# Patient Record
Sex: MALE | Race: WHITE | NOT HISPANIC OR LATINO | Employment: OTHER | URBAN - METROPOLITAN AREA
[De-identification: names, ages, dates, MRNs, and addresses within clinical notes are randomized per-mention and may not be internally consistent; named-entity substitution may affect disease eponyms.]

---

## 2018-03-26 ENCOUNTER — HOSPITAL ENCOUNTER (OUTPATIENT)
Dept: RADIOLOGY | Facility: HOSPITAL | Age: 73
Discharge: HOME | End: 2018-03-26
Attending: PHYSICIAN ASSISTANT
Payer: MEDICARE

## 2018-03-26 ENCOUNTER — OFFICE VISIT (OUTPATIENT)
Dept: URGENT CARE | Facility: CLINIC | Age: 73
End: 2018-03-26
Payer: MEDICARE

## 2018-03-26 VITALS
OXYGEN SATURATION: 95 % | SYSTOLIC BLOOD PRESSURE: 122 MMHG | DIASTOLIC BLOOD PRESSURE: 70 MMHG | TEMPERATURE: 100 F | RESPIRATION RATE: 20 BRPM | BODY MASS INDEX: 32.34 KG/M2 | WEIGHT: 252 LBS | HEART RATE: 82 BPM | HEIGHT: 74 IN

## 2018-03-26 DIAGNOSIS — S61.215A LACERATION OF LEFT RING FINGER WITHOUT DAMAGE TO NAIL, FOREIGN BODY PRESENCE UNSPECIFIED, INITIAL ENCOUNTER: ICD-10-CM

## 2018-03-26 DIAGNOSIS — S61.215A LACERATION OF LEFT RING FINGER WITHOUT DAMAGE TO NAIL, FOREIGN BODY PRESENCE UNSPECIFIED, INITIAL ENCOUNTER: Primary | ICD-10-CM

## 2018-03-26 DIAGNOSIS — S99.912A INJURY OF LEFT ANKLE, INITIAL ENCOUNTER: ICD-10-CM

## 2018-03-26 PROCEDURE — 99999 PR PBB SHADOW E&M-EST. PATIENT-LVL V: CPT | Mod: PBBFAC,,, | Performed by: PHYSICIAN ASSISTANT

## 2018-03-26 PROCEDURE — 73610 X-RAY EXAM OF ANKLE: CPT | Mod: TC,FY,PO,LT

## 2018-03-26 PROCEDURE — 73610 X-RAY EXAM OF ANKLE: CPT | Mod: 26,LT,, | Performed by: RADIOLOGY

## 2018-03-26 PROCEDURE — 99214 OFFICE O/P EST MOD 30 MIN: CPT | Mod: 25,S$GLB,, | Performed by: PHYSICIAN ASSISTANT

## 2018-03-26 PROCEDURE — 73130 X-RAY EXAM OF HAND: CPT | Mod: TC,FY,PO,LT

## 2018-03-26 PROCEDURE — 12002 RPR S/N/AX/GEN/TRNK2.6-7.5CM: CPT | Mod: S$GLB,,, | Performed by: PHYSICIAN ASSISTANT

## 2018-03-26 PROCEDURE — 73130 X-RAY EXAM OF HAND: CPT | Mod: 26,LT,, | Performed by: RADIOLOGY

## 2018-03-26 RX ORDER — CLINDAMYCIN HYDROCHLORIDE 150 MG/1
150 CAPSULE ORAL 3 TIMES DAILY
COMMUNITY
End: 2019-03-25

## 2018-03-26 RX ORDER — LIDOCAINE HYDROCHLORIDE 10 MG/ML
5 INJECTION, SOLUTION EPIDURAL; INFILTRATION; INTRACAUDAL; PERINEURAL
Status: DISCONTINUED | OUTPATIENT
Start: 2018-03-26 | End: 2018-03-26 | Stop reason: ALTCHOICE

## 2018-03-26 RX ADMIN — LIDOCAINE HYDROCHLORIDE 50 MG: 10 INJECTION, SOLUTION EPIDURAL; INFILTRATION; INTRACAUDAL; PERINEURAL at 04:03

## 2018-03-26 NOTE — PATIENT INSTRUCTIONS
Protect the joint with boot  Rest the extremity  Ice as many times a day for 15-20 minute intervals until inflammation has stabilized     Elevation above heart level to help decrease swelling if possible      No weight bearing of left foot, use wheelchair until cleared by Orthopedics.    Go to ER immediately if area becomes cold to touch, there is a loss of sensation or significant change in the color of the skin or worsening of symptoms.         Take OTC pain relievers.  Keep area clean and dry.  Apply antibiotic ointment and keep area covered with a non-adhesive dressing for 24 hours after wound closure.  After 24 hours, remove dressing, gently clean area with soap and water, pat dry and apply antibiotic ointment as directed.   Protect area from additional injury.  After wound has healed, used sunscreen to prevent scarring.  Follow up with PCP for reevaluation in 2-3 days or sooner if signs of infection as discussed (warmth, increased redness, purulent drainage, fevers) or wound reopens.  Instruct patient to elevate leg as necessary and use ice for swelling. Avoid strenuous activity that may cause swelling/pain.   Follow in 7-10 days for suture removal.

## 2018-03-26 NOTE — PROGRESS NOTES
"Subjective:       Patient ID: Stevie Pal is a 72 y.o. male.    Chief Complaint: Fall (fell off of a ladder. Laceration, abrasions noted. Pt c/o left ankle pain )    Fall   The accident occurred less than 1 hour ago. The fall occurred from a ladder. He fell from a height of 3 to 5 ft (patient states he was working on [employer] and he fell off the ladder and into the tree brushes, he states "the branches cushioned my fall" but notes many abrasions from the tree on upper extremities). He landed on grass. Point of impact: landed awkwardly on left foot and twisted, and also on his left upper arm/shoulder. Pain location: only pain is left ankle. Pain severity now: minimal pain if siting but severe ankle pain if tries to put weight on leg, unable to bear weight due to left ankle. The symptoms are aggravated by use of injured limb. Pertinent negatives include no abdominal pain, bowel incontinence, fever, headaches, hematuria, loss of consciousness, nausea, numbness, tingling, visual change or vomiting. Associated symptoms comments: Denies head strike, LOC. No pain anywhere other than ankle. Has cut to finger on left hand. He has tried nothing for the symptoms.     Review of Systems   Constitutional: Negative for chills, fatigue and fever.   HENT: Negative for congestion, rhinorrhea, sneezing and sore throat.    Respiratory: Negative for cough and shortness of breath.    Gastrointestinal: Negative for abdominal pain, bowel incontinence, nausea and vomiting.   Genitourinary: Positive for urgency. Negative for difficulty urinating, dysuria, flank pain and hematuria.   Musculoskeletal: Positive for arthralgias, gait problem and joint swelling. Negative for myalgias.   Skin: Positive for wound. Negative for rash.   Neurological: Negative for tingling, loss of consciousness, numbness and headaches.       Objective:      /70 (BP Location: Right arm, Patient Position: Sitting, BP Method: Medium (Automatic))   " "Pulse 82   Temp 99.6 °F (37.6 °C) (Tympanic)   Resp 20   Ht 6' 2" (1.88 m)   Wt 114.3 kg (252 lb)   SpO2 95%   BMI 32.35 kg/m²   Physical Exam   Constitutional: He is oriented to person, place, and time. He appears well-developed and well-nourished. No distress.   HENT:   Head: Normocephalic and atraumatic.   Right Ear: External ear normal.   Left Ear: External ear normal.   Nose: Nose normal.   Eyes: Conjunctivae and EOM are normal. Right eye exhibits no discharge. Left eye exhibits no discharge.   Neck: Normal range of motion. Neck supple.   Cardiovascular: Normal rate, regular rhythm, normal heart sounds and intact distal pulses.  Exam reveals no gallop and no friction rub.    No murmur heard.  Pulmonary/Chest: Effort normal and breath sounds normal. No respiratory distress. He has no wheezes. He has no rales.   Abdominal: Soft. Bowel sounds are normal. He exhibits no distension. There is no tenderness. There is no rebound, no guarding and no CVA tenderness.   Musculoskeletal:        Left ankle: He exhibits decreased range of motion (full plantar flexion/dorsi but limited pronation/supination) and swelling (medial malleolus). He exhibits no laceration. Tenderness. Medial malleolus tenderness found. No lateral malleolus and no head of 5th metatarsal tenderness found.   Neurological: He is alert and oriented to person, place, and time.   Skin: Skin is warm and dry. Abrasion (numerous superficial abrasions to bilateral upper extremities) and laceration (V shaped 3 cm laceration to palmar 4th digit on left hand, no nail involvement) noted. No rash noted. He is not diaphoretic. No erythema.   Vitals reviewed.      Assessment:       1. Laceration of left ring finger without damage to nail, foreign body presence unspecified, initial encounter    2. Injury of left ankle, initial encounter        Plan:       Laceration of left ring finger without damage to nail, foreign body presence unspecified, initial " encounter  -     X-Ray Hand 3 view Left; Future; Expected date: 03/26/2018    Injury of left ankle, initial encounter  -     X-Ray Ankle Complete Left; Future; Expected date: 03/26/2018    Laceration Repair:  Verbal and written consent received from patient, he verbalized understanding of procedure, risks, and benefits.   Sterile technique. Wound was flushed thoroughly with sterile water/normal saline 500 ml, cleaned with Betadine. Wound explored no involvement of deep structures or foreign body present. ~ 5mL 1% Lidocaine without epinephrine. 5 sutures were placed using a totally interrupted suturing approach. 4-0 Ethilon used. Area was dressed with non adherent gauze, 2x2 gauze and paper tape. Patient tolerated procedure well. Neurovascularly intact distal to the wound and no immediate complications.          Further instruction:  Take OTC pain relievers.  Keep area clean and dry.  Apply antibiotic ointment and keep area covered with a non-adhesive dressing for 24 hours after wound closure.  After 24 hours, remove dressing, gently clean area with soap and water, pat dry and apply antibiotic ointment as directed.   Protect area from additional injury.  After wound has healed, used sunscreen to prevent scarring.  Follow up with PCP for reevaluation in 2-3 days or sooner if signs of infection as discussed (warmth, increased redness, purulent drainage, fevers) or wound reopens.  Instruct patient to elevate leg as necessary and use ice for swelling. Avoid strenuous activity that may cause swelling/pain.   Follow in 7-10 days for suture removal.         Patient confident no head strike, he explains that scratch to right temple is from tree branch (and he is covered in minor scratches from falling into tree). Strong ER warnings given for headache, drowsiness, confusion, shortness of breath, etc. since he is on eliquis.  L finger laceration: tetanus needed, waiver signed will get at pharmacy of choice. Already on  "clindamycin for other infection which will cover him as well. We discussed presence of ring, he is adamant to keep ring ("no one's cutting off this ring"), cannot remove secondary to swelling, he was not digitally blocked and has sensation proximal to laceration, reviewed signs/symptoms of ischemic digit patient aware if these develop to seek ER for ring to be cut off.  Left ankle: x rays cannot rule out fracture, I have concern for this given significant pain could have severe sprain with avulsion fracture. Will not splint as patient cannot observe complete NWB with crutches due to hand laceration. Boot placed for immobilization and will allow toe touch weight bearing for stability.  Script for wheelchair given, referral to Ortho placed.        Heather Trant PA-C [facility]  "

## 2018-03-27 ENCOUNTER — OFFICE VISIT (OUTPATIENT)
Dept: ORTHOPEDICS | Facility: CLINIC | Age: 73
End: 2018-03-27
Payer: MEDICARE

## 2018-03-27 VITALS — HEART RATE: 69 BPM | SYSTOLIC BLOOD PRESSURE: 107 MMHG | DIASTOLIC BLOOD PRESSURE: 64 MMHG | RESPIRATION RATE: 12 BRPM

## 2018-03-27 DIAGNOSIS — S82.892A CLOSED AVULSION FRACTURE OF LEFT ANKLE, INITIAL ENCOUNTER: Primary | ICD-10-CM

## 2018-03-27 DIAGNOSIS — S61.213A LACERATION OF LEFT MIDDLE FINGER WITHOUT FOREIGN BODY WITHOUT DAMAGE TO NAIL, INITIAL ENCOUNTER: ICD-10-CM

## 2018-03-27 DIAGNOSIS — M25.572 ACUTE LEFT ANKLE PAIN: Primary | ICD-10-CM

## 2018-03-27 PROCEDURE — 99999 PR PBB SHADOW E&M-EST. PATIENT-LVL IV: CPT | Mod: PBBFAC,,, | Performed by: PHYSICIAN ASSISTANT

## 2018-03-27 PROCEDURE — 99203 OFFICE O/P NEW LOW 30 MIN: CPT | Mod: S$GLB,,, | Performed by: PHYSICIAN ASSISTANT

## 2018-03-27 NOTE — PROGRESS NOTES
Subjective:      Patient ID: Stevie Pal is a 72 y.o. male.    Chief Complaint: Injury and Pain of the Left Ankle      HPI: Stevie aPl  is a 72 y.o. male who c/o Injury and Pain of the Left Ankle   for duration of 1 day.  He fell from a 5 foot ladder yesterday while cutting some tree limbs.  He says that when madeline which caused him to lose balance and fall.  He landed directly on his back.  He sustained a laceration to the volar aspect of his left ring finger as well as an injury to his left ankle.  He came to the urgent care department he had the left ring finger stitch.  They x-rayed the ankle and were worried about an ankle fracture, so they put him in a short boot and told him to follow-up with orthopedics.  He has a pair of crutches, but does not have a walker.  They gave him a prescription for a wheelchair yesterday.  Pain with weightbearing is 9 out of 10 in severity.  At rest is 6 out of 10 in severity.  Quality is aching and throbbing.  Alleviating factors include nonweightbearing and immobilization.  Aggravating factors include weightbearing.,  He has not removed his wedding ring from his left ring finger.  He has significant amount of swelling in the proximal phalanx which he states Lucero him from removing the ring.  He has no pain in the finger.    Review of Systems   Constitution: Negative for fever.   Cardiovascular: Negative for chest pain.   Respiratory: Negative for cough and shortness of breath.    Skin: Positive for color change (ecchymosis left foot). Negative for rash.   Musculoskeletal: Positive for falls (from 5 foot ladder yesterday), joint pain, joint swelling and stiffness.   Gastrointestinal: Negative for heartburn.   Neurological: Negative for headaches and numbness.         Objective:        General    Nursing note and vitals reviewed.  Constitutional: He is oriented to person, place, and time. He appears well-developed and well-nourished.   HENT:   Head: Normocephalic  and atraumatic.   Eyes: EOM are normal.   Cardiovascular: Normal rate and regular rhythm.    Pulmonary/Chest: Effort normal.   Abdominal: Soft.   Neurological: He is alert and oriented to person, place, and time.   Psychiatric: He has a normal mood and affect. His behavior is normal.         Left Ankle/Foot Exam     Inspection  Deformity: absent  Erythema: absent  Bruising: Ankle - present Foot - present  Effusion: Ankle - present Foot - absent  Atrophy: Ankle - absent Foot - absent    Swelling   The patient is swollen on the anterior talofibular ligament, calcaneofibular ligament, lateral malleolus, medial malleolus and posterior talofibular ligament.    Tenderness   The patient is tender to palpation of the ATF and lateral malleolus.    Range of Motion   Ankle Joint  Dorsiflexion: abnormal   Plantar flexion: abnormal     Subtalar Joint   Inversion: abnormal   Eversion: abnormal     Other   Sensation: normal    Comments:  Limited range of motion secondary to pain.  He has intact dorsiflexion and plantarflexion.  Strength testing not done secondary to pain and discomfort.  Set to palpation also along the deltoid ligament.  Left Hand/Wrist Exam     Comments:  2+ radial pulse.  Capillary refill less than 2 seconds.  2-3 cm aspiration across the volar aspect of the proximal phalanx of the left ring finger.  Wound edges are well approximated with sutures in place.  He has minimal bloody drainage.  No erythema and no purulent drainage.  Flexion and extension are intact to the MCP, PIP, and DIP joints of the left ring finger.  He has significant swelling within the proximal phalanx which prevents his ability to remove his wedding ring.  Sensation is intact to the left ring finger.          Vascular Exam       Left Pulses  Dorsalis Pedis:      2+          Edema  Left Lower Leg: absent            Xray:   Left ankle from 3/26/18 images and report were reviewed today.  I agree with the radiologist's interpretation.  There is  mild soft tissue swelling overlying the medial and lateral malleoli.  Small calcific density noted at the distal tip of the fibula, possibly degenerative or chronic posttraumatic changes though small chip/avulsion type fracture not excluded.  Lucent cystic changes versus osteochondral injury at the medial corner of the talar dome.  Degenerative changes in the ankle and midfoot.  Prominent plantar calcaneal spur.  Left hand from 3/26/18 images and report were reviewed today.  I agree with the radiologist's interpretation.  There is no acute fracture or dislocation.  Minimal multi articular degenerative changes noted.  Exam limited by wedding ring which was not removed for x-ray.    Assessment:       Encounter Diagnoses   Name Primary?    Closed avulsion fracture of left ankle, initial encounter Yes    Laceration of left middle finger without foreign body without damage to nail, initial encounter           Plan:       Stevie was seen today for injury and pain.    Diagnoses and all orders for this visit:    Closed avulsion fracture of left ankle, initial encounter  -     WALKER FOR HOME USE  -     Ambulatory referral to Home Health    Laceration of left middle finger without foreign body without damage to nail, initial encounter  -     Ambulatory referral to Home Health    Mr. Pal is a new patient with new problems as above.  He has an avulsion fracture of the left ankle.  I recommend immobilization in a tall boot as well as a prescription for a walker.  I have ordered home health care for physical and occupational therapy to do an in-home safety assessment as well as evaluate and treat him for partial progressive weightbearing left lower extremity with a walker.  After having the tall boot placed on the left ankle, he was able to stand and weight-bear without difficulty.  Therefore, he refused home healthcare and make order was canceled.  I will see him back in the office in one month to reevaluate his ankle.   At that time, he needs repeat x-rays.  Should his pain worsen, I would like to see him earlier to have repeat x-rays done at that time.  He tells me he does not need anything for pain.  I attempted to remove the ring here in the office.  I was unable to do it secondary to swelling.  I even tried to cut the ring here in the office but the ring cutter did not even make a scratch on it.  They do not know what type of metal the ring is, but I suspect it is titanium.  I have given him signs and symptoms of vascular compromise.  Should he experience any of these, he should go directly to the emergency room to seek treatment.  In the meantime, he will continue with elevation and ice.  As swelling improves, he may be able to remove the ring on his own using K-Y jelly or another lubricant.  He verbalizes understanding and agrees.      RTC 1 month for f/u.    The patient understands, chooses and consents to this plan and accepts all   the risks which include but are not limited to the risks mentioned above.     Disclaimer: This note was prepared using a voice recognition system and is likely to have sound alike errors within the text.

## 2018-03-27 NOTE — LETTER
March 27, 2018      Kika Billingsley PA-C  [address] [place] [place] [address]           [facility]  [address]  [place] [address]  Phone: [phone]  Fax: [phone]          Patient: Stevie Pal   MR Number: [id number]   YOB: 1945   Date of Visit: 3/27/2018       Dear Kika Billingsley:    Thank you for referring Stevie Pal to me for evaluation. Attached you will find relevant portions of my assessment and plan of care.    If you have questions, please do not hesitate to call me. I look forward to following Stevie Pal along with you.    Sincerely,    Tamela Garcia PA-C    Enclosure  CC:  No Recipients    If you would like to receive this communication electronically, please contact [email or web address] or [phone] to request more information on [employer] access.    For providers and/or their staff who would like to refer a patient to [facility], please contact us through our one-stop-shop provider referral line, [facility], at [phone].    If you feel you have received this communication in error or would no longer like to receive these types of communications, please e-mail [email or web address]

## 2018-04-03 ENCOUNTER — OFFICE VISIT (OUTPATIENT)
Dept: URGENT CARE | Facility: CLINIC | Age: 73
End: 2018-04-03
Payer: MEDICARE

## 2018-04-03 VITALS
BODY MASS INDEX: 35.21 KG/M2 | SYSTOLIC BLOOD PRESSURE: 120 MMHG | TEMPERATURE: 99 F | OXYGEN SATURATION: 95 % | DIASTOLIC BLOOD PRESSURE: 80 MMHG | HEART RATE: 74 BPM | WEIGHT: 274.25 LBS

## 2018-04-03 DIAGNOSIS — Z48.02 VISIT FOR SUTURE REMOVAL: Primary | ICD-10-CM

## 2018-04-03 PROCEDURE — 99999 PR PBB SHADOW E&M-EST. PATIENT-LVL IV: CPT | Mod: PBBFAC,,, | Performed by: NURSE PRACTITIONER

## 2018-04-03 PROCEDURE — 99499 UNLISTED E&M SERVICE: CPT | Mod: S$GLB,,, | Performed by: NURSE PRACTITIONER

## 2018-04-03 NOTE — PATIENT INSTRUCTIONS
"  Suture or Staple Removal  You were seen today for a suture or staple removal. Your wound is healing as expected. The wound has healed well enough that the sutures or staples can be removed. The wound will continue to heal for the next few months.  At this time there is no sign of infection.   Home care  · If you have pain, take pain medicine as advised by your healthcare provider.   · Keep your wound clean and protected by covering it with a bandage for the next week or so.   · Wash your hands with soap and warm water before and after caring for your wound. This helps prevent infection.  · Clean the wound gently with soap and warm water daily or as directed by your childs health care provider. Do not use iodine, alcohol, or other cleansers on the wound.  Gently pat it dry. Put on a new bandage, if needed. Do not reuse bandages.  · If the area gets wet, gently pat it dry with a clean cloth. Replace the wet bandage with a dry one.  · Check the wound daily for signs of infection. (These are listed under "When to seek medical advice" below.)  · You may shower and bathe as usual. Swimming is now permitted.  Follow-up care  Follow up with your healthcare provider as advised.  When to seek medical advice   Call your healthcare provider if any of the following occur:  · Wound reopens or bleeds  · Signs of an infection, such as:  ¨ Increasing redness or swelling around the wound  ¨ Increased warmth from the wound  ¨ Worsening pain  ¨ Red streaking lines away from the wound  ¨ Fluid draining from the wound  · Fever of 100.4°F (38°C) or higher, or as directed by your child's healthcare provider  Date Last Reviewed: 9/27/2015  © 1211-7801 [employer]. [address] [place] [address], [place] [address]. All rights reserved. This information is not intended as a substitute for professional medical care. Always follow your healthcare professional's instructions.        "

## 2018-04-03 NOTE — PROGRESS NOTES
Subjective:       Patient ID: Stevie Pal is a 72 y.o. male.    Chief Complaint: Suture / Staple Removal    Pt is a 72 year old male to clinic today for suture removal from a fall that occurred 8 days ago. Pt had 5 sutures placed on his left hand, 4th digit.       Suture / Staple Removal   The sutures were placed 7 to 10 days ago. He tried nothing since the wound repair. The treatment provided moderate relief. His temperature was unmeasured prior to arrival. There has been no drainage from the wound. There is no redness present. The swelling has improved. The pain has improved. He has no difficulty moving the affected extremity or digit.     Review of Systems   Constitutional: Negative for chills, diaphoresis, fatigue and fever.   HENT: Negative for congestion, sinus pressure and sore throat.    Eyes: Negative for pain.   Respiratory: Negative for cough, chest tightness, shortness of breath and wheezing.    Cardiovascular: Negative for chest pain and palpitations.   Gastrointestinal: Negative for abdominal pain, diarrhea, nausea and vomiting.   Skin: Negative for rash.   Neurological: Negative for dizziness, light-headedness and headaches.       Objective:      Physical Exam   Constitutional: He is oriented to person, place, and time. He appears well-developed and well-nourished. No distress.   HENT:   Head: Normocephalic.   Right Ear: External ear normal.   Left Ear: External ear normal.   Nose: Nose normal.   Eyes: Pupils are equal, round, and reactive to light.   Musculoskeletal: Normal range of motion. He exhibits tenderness. He exhibits no edema or deformity.   Neurological: He is alert and oriented to person, place, and time.   Skin: Skin is warm and dry. Laceration noted. No rash noted. He is not diaphoretic. No erythema.   No s/sx of infection noted. No drainage, redness noted.  Full ROM of finger. Healing skin noted. 5 sutures removed without difficulty. Pt tolerated well.    Psychiatric: He has a  normal mood and affect. His speech is normal and behavior is normal. Thought content normal.   Nursing note and vitals reviewed.      Assessment:       1. Visit for suture removal        Plan:   Visit for suture removal      Educated on s/sx of infection.   Recommend keep clean and dry.    Follow prescribed treatment plan as directed.  Stay hydrated and rest.  Report to ER if symptoms worsen.  Follow up with PCP in 2-3 days or sooner if symptoms do not improve.

## 2019-03-27 ENCOUNTER — OFFICE VISIT (OUTPATIENT)
Dept: URGENT CARE | Facility: CLINIC | Age: 74
End: 2019-03-27
Payer: MEDICARE

## 2019-03-27 VITALS
HEIGHT: 75 IN | TEMPERATURE: 96 F | WEIGHT: 268.5 LBS | DIASTOLIC BLOOD PRESSURE: 82 MMHG | BODY MASS INDEX: 33.38 KG/M2 | OXYGEN SATURATION: 95 % | SYSTOLIC BLOOD PRESSURE: 120 MMHG | HEART RATE: 77 BPM

## 2019-03-27 DIAGNOSIS — J40 BRONCHITIS: Primary | ICD-10-CM

## 2019-03-27 PROBLEM — J06.9 VIRAL UPPER RESPIRATORY TRACT INFECTION: Status: ACTIVE | Noted: 2019-03-27

## 2019-03-27 PROBLEM — J06.9 VIRAL UPPER RESPIRATORY TRACT INFECTION: Status: RESOLVED | Noted: 2019-03-27 | Resolved: 2019-03-27

## 2019-03-27 PROCEDURE — 99214 OFFICE O/P EST MOD 30 MIN: CPT | Mod: S$GLB,,, | Performed by: FAMILY MEDICINE

## 2019-03-27 PROCEDURE — 1101F PT FALLS ASSESS-DOCD LE1/YR: CPT | Mod: CPTII,S$GLB,, | Performed by: FAMILY MEDICINE

## 2019-03-27 PROCEDURE — 1101F PR PT FALLS ASSESS DOC 0-1 FALLS W/OUT INJ PAST YR: ICD-10-PCS | Mod: CPTII,S$GLB,, | Performed by: FAMILY MEDICINE

## 2019-03-27 PROCEDURE — 99999 PR PBB SHADOW E&M-EST. PATIENT-LVL III: ICD-10-PCS | Mod: PBBFAC,,, | Performed by: FAMILY MEDICINE

## 2019-03-27 PROCEDURE — 99214 PR OFFICE/OUTPT VISIT, EST, LEVL IV, 30-39 MIN: ICD-10-PCS | Mod: S$GLB,,, | Performed by: FAMILY MEDICINE

## 2019-03-27 PROCEDURE — 99999 PR PBB SHADOW E&M-EST. PATIENT-LVL III: CPT | Mod: PBBFAC,,, | Performed by: FAMILY MEDICINE

## 2019-03-27 RX ORDER — IPRATROPIUM BROMIDE AND ALBUTEROL SULFATE 2.5; .5 MG/3ML; MG/3ML
3 SOLUTION RESPIRATORY (INHALATION) EVERY 6 HOURS PRN
Qty: 1 BOX | Refills: 0 | Status: SHIPPED | OUTPATIENT
Start: 2019-03-27 | End: 2019-03-27 | Stop reason: CLARIF

## 2019-03-27 RX ORDER — ALBUTEROL SULFATE 90 UG/1
2 AEROSOL, METERED RESPIRATORY (INHALATION) EVERY 6 HOURS PRN
Qty: 18 G | Refills: 0 | Status: SHIPPED | OUTPATIENT
Start: 2019-03-27 | End: 2020-03-26

## 2019-03-27 RX ORDER — PROMETHAZINE HYDROCHLORIDE AND DEXTROMETHORPHAN HYDROBROMIDE 6.25; 15 MG/5ML; MG/5ML
5 SYRUP ORAL NIGHTLY
Qty: 118 ML | Refills: 0 | Status: SHIPPED | OUTPATIENT
Start: 2019-03-27

## 2019-03-27 RX ORDER — FUROSEMIDE 20 MG/1
20 TABLET ORAL DAILY PRN
COMMUNITY

## 2019-03-27 NOTE — PROGRESS NOTES
Subjective:       Patient ID: Stevie Pal is a 73 y.o. male.    Chief Complaint: Cough and Chest Congestion    Received abx and celestone from PCP - this did not help    URI    This is a recurrent problem. Episode onset: 1.5 wks. The problem has been unchanged. There has been no fever. Associated symptoms include congestion and coughing. Pertinent negatives include no abdominal pain, chest pain, sinus pain, sore throat, vomiting or wheezing. Treatments tried: abx, steroids. The treatment provided no relief.     Review of Systems   HENT: Positive for congestion. Negative for sinus pain and sore throat.    Respiratory: Positive for cough. Negative for wheezing.    Cardiovascular: Negative for chest pain.   Gastrointestinal: Negative for abdominal pain and vomiting.       Objective:      Physical Exam   Constitutional: He appears well-developed and well-nourished. No distress.   HENT:   Head: Normocephalic and atraumatic.   Mouth/Throat: Oropharynx is clear and moist. No oropharyngeal exudate.   Pulmonary/Chest: Effort normal and breath sounds normal. No respiratory distress. He has no wheezes.   Cough on exam   Skin: Skin is warm and dry. No rash noted. He is not diaphoretic. No erythema.   Nursing note and vitals reviewed.      Assessment:       1. Bronchitis        Plan:     Problem List Items Addressed This Visit        Pulmonary    Bronchitis - Primary    Relevant Medications    albuterol (PROVENTIL/VENTOLIN HFA) 90 mcg/actuation inhaler    promethazine-dextromethorphan (PROMETHAZINE-DM) 6.25-15 mg/5 mL Syrp

## 2020-05-07 ENCOUNTER — OFFICE VISIT (OUTPATIENT)
Dept: URGENT CARE | Facility: CLINIC | Age: 75
End: 2020-05-07
Payer: MEDICARE

## 2020-05-07 VITALS — HEART RATE: 73 BPM | TEMPERATURE: 98 F | RESPIRATION RATE: 18 BRPM | OXYGEN SATURATION: 95 %

## 2020-05-07 DIAGNOSIS — M10.9 GOUT, UNSPECIFIED CAUSE, UNSPECIFIED CHRONICITY, UNSPECIFIED SITE: Primary | ICD-10-CM

## 2020-05-07 PROCEDURE — 99213 PR OFFICE/OUTPT VISIT, EST, LEVL III, 20-29 MIN: ICD-10-PCS | Mod: S$GLB,,, | Performed by: PHYSICIAN ASSISTANT

## 2020-05-07 PROCEDURE — 99213 OFFICE O/P EST LOW 20 MIN: CPT | Mod: S$GLB,,, | Performed by: PHYSICIAN ASSISTANT

## 2020-05-07 RX ORDER — COLCHICINE 0.6 MG/1
CAPSULE ORAL
Qty: 3 CAPSULE | Refills: 0 | Status: SHIPPED | OUTPATIENT
Start: 2020-05-07

## 2020-05-07 RX ORDER — METHYLPREDNISOLONE 4 MG/1
TABLET ORAL
Qty: 1 PACKAGE | Refills: 0 | Status: SHIPPED | OUTPATIENT
Start: 2020-05-07

## 2020-05-07 NOTE — PROGRESS NOTES
Subjective:       Patient ID: Stevie Pal is a 74 y.o. male.    Vitals:  oral temperature is 98.2 °F (36.8 °C). His pulse is 73. His respiration is 18 and oxygen saturation is 95%.     Chief Complaint: Foot Pain    Stevie is a 74 year old male who presents to urgent care with left foot pain that began earlier today.  He denies history of trauma/injury to his foot.  He has never had a gout flare, but his brothers have gout, and Stevie feels confident that his symptoms are similar to his brother's experiences with gout. He admits proximal foot pain that is tender to touch.  He admits slight warmth.  He denies any erythema, fever, or chills.      Foot Pain   This is a new problem. The current episode started today. The problem occurs constantly. The problem has been unchanged. Associated symptoms include arthralgias and joint swelling. Pertinent negatives include no chest pain, chills, congestion, coughing, fatigue, fever, headaches, myalgias, nausea, rash, sore throat, vertigo or vomiting. Nothing aggravates the symptoms. He has tried nothing for the symptoms. The treatment provided no relief.       Constitution: Negative for chills, fatigue and fever.   HENT: Negative for congestion and sore throat.    Neck: Negative for painful lymph nodes.   Cardiovascular: Negative for chest pain and leg swelling.   Eyes: Negative for double vision and blurred vision.   Respiratory: Negative for cough and shortness of breath.    Gastrointestinal: Negative for nausea, vomiting and diarrhea.   Genitourinary: Negative for dysuria, frequency and urgency.   Musculoskeletal: Positive for joint pain and joint swelling. Negative for muscle cramps and muscle ache.        Foot Pain   Skin: Negative for color change, pale, rash and erythema.   Allergic/Immunologic: Negative for seasonal allergies.   Neurological: Negative for dizziness, history of vertigo, light-headedness, passing out and headaches.   Hematologic/Lymphatic:  Negative for swollen lymph nodes, easy bruising/bleeding and history of blood clots. Does not bruise/bleed easily.   Psychiatric/Behavioral: Negative for nervous/anxious, sleep disturbance and depression. The patient is not nervous/anxious.        Objective:      Physical Exam   Constitutional: He is oriented to person, place, and time. He appears well-developed and well-nourished.   HENT:   Head: Normocephalic and atraumatic. Head is without abrasion, without contusion and without laceration.   Right Ear: External ear normal.   Left Ear: External ear normal.   Nose: Nose normal.   Mouth/Throat: Oropharynx is clear and moist and mucous membranes are normal.   Eyes: Pupils are equal, round, and reactive to light. Conjunctivae, EOM and lids are normal.   Neck: Trachea normal, full passive range of motion without pain and phonation normal. Neck supple.   Cardiovascular: Normal rate.   Pulmonary/Chest: Effort normal. No stridor. No respiratory distress.   Musculoskeletal: Normal range of motion.        Left ankle: He exhibits no swelling, no ecchymosis, no deformity, no laceration and normal pulse. Tenderness (see graphical documentation ).        Feet:    Neurological: He is alert and oriented to person, place, and time.   Skin: Skin is warm, dry, intact and no rash. Capillary refill takes less than 2 seconds. not left ankleabrasion, burn, bruising, erythema and ecchymosis  Psychiatric: He has a normal mood and affect. His speech is normal and behavior is normal. Judgment and thought content normal. Cognition and memory are normal.   Nursing note and vitals reviewed.        Assessment:       1. Gout, unspecified cause, unspecified chronicity, unspecified site        Plan:         Gout, unspecified cause, unspecified chronicity, unspecified site  -     colchicine (MITIGARE) 0.6 mg Cap; Take two capsules by mouth.  One hour later, take one capsule  Dispense: 3 capsule; Refill: 0  -     methylPREDNISolone (MEDROL  DOSEPACK) 4 mg tablet; use as directed  Dispense: 1 Package; Refill: 0          Left Foot Pain   -  Offered uric acid testing, patient declined at this time, saying he is confident it is gout    -  Colchicine for acute gout flare    -  Steroid to reduce inflammation   -  Elevate extremity/ice    -  Return or follow-up with pcp for new/worsening symptoms    Jacqueline Solo PA-C   Physician Assistant   [facility]

## 2020-05-07 NOTE — PATIENT INSTRUCTIONS
Treating Gout Attacks     Raising the joint above the level of your heart can help reduce gout symptoms.     Gout is a disease that affects the joints. It is caused by excess uric acid in your blood stream that may lead to crystals forming in your joints. Left untreated, it can lead to painful foot and joint deformities and even kidney problems. But, by treating gout early, you can relieve pain and help prevent future problems. Gout can usually be treated with medication and proper diet. In severe cases, surgery may be needed.  Gout attacks are painful and often happen more than once. Taking medications may reduce pain and prevent attacks in the future. There are also some things you can do at home to relieve symptoms.  Medications for gout  Your healthcare provider may prescribe a daily medication to reduce levels of uric acid. Reducing your uric acid levels may help prevent gout attacks. Allopurinol is one commonly used medication taken daily to reduce uric acid levels. Other medications can help relieve pain and swelling during an acute attack. Medicines such as NSAIDs (nonsteroidal anti-inflammatory medicines), steroids, and colchicine may be prescribed for intermittent use to relieve an acute gout attack. Be sure to take your medication as directed.  What you can do  Below are some things you can do at home to relieve gout symptoms. Your healthcare provider may have other tips.  · Rest the painful joint as much as you can.  · Raise the painful joint so it is at a level higher than your heart.  · Use ice for 10 minutes every 1-2 hours as possible.  How can I prevent gout?  With a little effort, you may be able to prevent gout attacks in the future. Here are some things you can do:  · Avoid foods high in purines  ¨ Certain meats (red meat, processed meat, turkey)  ¨ Organ meats (kidney, liver, sweetbread)  ¨ Shellfish (lobster, crab, shrimp, scallop, mussel)  ¨ Certain fish (anchovy, sardine, herring,  mackerel)  · Take any medications prescribed by your healthcare provider.  · Lose weight if you need to.  · Reduce high fructose corn syrup in meals and drinks.  · Reduce or eliminate consumption of alcohol, particularly beer, but also red wine and spirits.  · Control blood pressure, diabetes, and cholesterol.  · Drink plenty of water to help flush uric acid from your body.  Date Last Reviewed: 2/1/2016  © 2664-5078 [employer] [address] [place] [address], [place] [address]. All rights reserved. This information is not intended as a substitute for professional medical care. Always follow your healthcare professional's instructions.

## 2020-05-09 ENCOUNTER — TELEPHONE (OUTPATIENT)
Dept: URGENT CARE | Facility: CLINIC | Age: 75
End: 2020-05-09

## 2023-12-16 ENCOUNTER — OFFICE VISIT (OUTPATIENT)
Dept: URGENT CARE | Facility: CLINIC | Age: 78
End: 2023-12-16
Payer: MEDICARE

## 2023-12-16 VITALS
DIASTOLIC BLOOD PRESSURE: 74 MMHG | SYSTOLIC BLOOD PRESSURE: 145 MMHG | HEIGHT: 75 IN | WEIGHT: 268 LBS | OXYGEN SATURATION: 95 % | BODY MASS INDEX: 33.32 KG/M2 | TEMPERATURE: 99 F | HEART RATE: 72 BPM | RESPIRATION RATE: 18 BRPM

## 2023-12-16 DIAGNOSIS — R05.9 COUGH, UNSPECIFIED TYPE: Primary | ICD-10-CM

## 2023-12-16 LAB
CTP QC/QA: YES
SARS-COV-2 AG RESP QL IA.RAPID: NEGATIVE

## 2023-12-16 PROCEDURE — 99204 PR OFFICE/OUTPT VISIT, NEW, LEVL IV, 45-59 MIN: ICD-10-PCS | Mod: S$GLB,,, | Performed by: PHYSICIAN ASSISTANT

## 2023-12-16 PROCEDURE — 87811 SARS CORONAVIRUS 2 ANTIGEN POCT, MANUAL READ: ICD-10-PCS | Mod: QW,S$GLB,, | Performed by: PHYSICIAN ASSISTANT

## 2023-12-16 PROCEDURE — 87811 SARS-COV-2 COVID19 W/OPTIC: CPT | Mod: QW,S$GLB,, | Performed by: PHYSICIAN ASSISTANT

## 2023-12-16 PROCEDURE — 99204 OFFICE O/P NEW MOD 45 MIN: CPT | Mod: S$GLB,,, | Performed by: PHYSICIAN ASSISTANT

## 2023-12-16 RX ORDER — BROMPHENIRAMINE MALEATE, PSEUDOEPHEDRINE HYDROCHLORIDE, AND DEXTROMETHORPHAN HYDROBROMIDE 2; 30; 10 MG/5ML; MG/5ML; MG/5ML
10 SYRUP ORAL EVERY 6 HOURS PRN
Qty: 118 ML | Refills: 0 | Status: SHIPPED | OUTPATIENT
Start: 2023-12-16 | End: 2023-12-26

## 2023-12-16 NOTE — PROGRESS NOTES
"Subjective:      Patient ID: Stevie Pal is a 78 y.o. male.    Vitals:  height is 6' 3" (1.905 m) and weight is 121.6 kg (268 lb). His oral temperature is 98.6 °F (37 °C). His blood pressure is 145/74 (abnormal) and his pulse is 72. His respiration is 18 and oxygen saturation is 95%.     Chief Complaint: Cough    Pt c/o a cough x 2 days, pt has a runny nose and congestion x 1 day.  He has tried multiple OTC medications with no significant relief.  He denies fever, chills, shortness for breath, throat pain, ear pain, nausea, vomiting, diarrhea, abdominal pain, and/or any other symptoms associated with this complaint.    Cough  This is a new problem. The problem has been gradually worsening. The cough is Productive of sputum. Associated symptoms include postnasal drip. Pertinent negatives include no chest pain, chills, ear congestion, ear pain, fever, headaches, heartburn, hemoptysis, myalgias, nasal congestion, rash, rhinorrhea, sore throat, shortness of breath, sweats, weight loss or wheezing. Nothing aggravates the symptoms. He has tried OTC cough suppressant for the symptoms. The treatment provided mild relief. There is no history of asthma, bronchiectasis, bronchitis, COPD, emphysema or environmental allergies.       Constitution: Negative for chills and fever.   HENT:  Positive for postnasal drip. Negative for ear pain and sore throat.    Cardiovascular:  Negative for chest pain.   Respiratory:  Positive for cough. Negative for bloody sputum, shortness of breath and wheezing.    Gastrointestinal:  Negative for heartburn.   Musculoskeletal:  Negative for muscle ache.   Skin:  Negative for rash.   Allergic/Immunologic: Negative for environmental allergies.   Neurological:  Negative for headaches.      Objective:     Physical Exam   Constitutional: He is oriented to person, place, and time. He appears well-developed.   HENT:   Head: Normocephalic and atraumatic.   Ears:   Right Ear: External ear normal. "   Left Ear: External ear normal.   Nose: Nose normal.   Mouth/Throat: Oropharynx is clear and moist. Mucous membranes are moist. Oropharynx is clear.   Eyes: Conjunctivae, EOM and lids are normal.   Neck: Trachea normal and phonation normal. Neck supple.   Cardiovascular: Normal rate, regular rhythm and normal heart sounds.   Pulmonary/Chest: Breath sounds normal. He has no wheezes. He has no rhonchi.   Musculoskeletal: Normal range of motion.         General: Normal range of motion.   Neurological: He is alert and oriented to person, place, and time.   Skin: Skin is warm, dry and intact.   Psychiatric: His speech is normal and behavior is normal. Judgment and thought content normal.   Nursing note and vitals reviewed.      Assessment:     1. Cough, unspecified type      Results for orders placed or performed in visit on 12/16/23   SARS Coronavirus 2 Antigen, POCT Manual Read   Result Value Ref Range    SARS Coronavirus 2 Antigen Negative Negative     Acceptable Yes        Plan:   VSS. Patient non-toxic appearing. Discussed medication being prescribed.  Advised patient to follow up with PCP as needed.  Patient verbalized understanding, agrees with the plan, and is comfortable with discharge.      Cough, unspecified type  -     SARS Coronavirus 2 Antigen, POCT Manual Read  -     brompheniramine-pseudoeph-DM (BROMFED DM) 2-30-10 mg/5 mL Syrp; Take 10 mLs by mouth every 6 (six) hours as needed.  Dispense: 118 mL; Refill: 0      Medical Decision Making:   Clinical Tests:   Lab Tests: Ordered and Reviewed       <> Summary of Lab: COVID negative

## 2024-12-01 ENCOUNTER — OFFICE VISIT (OUTPATIENT)
Dept: URGENT CARE | Facility: CLINIC | Age: 79
End: 2024-12-01
Payer: MEDICARE

## 2024-12-01 ENCOUNTER — HOSPITAL ENCOUNTER (OUTPATIENT)
Dept: RADIOLOGY | Facility: CLINIC | Age: 79
Discharge: HOME | End: 2024-12-01
Attending: PHYSICIAN ASSISTANT
Payer: MEDICARE

## 2024-12-01 VITALS
HEART RATE: 81 BPM | SYSTOLIC BLOOD PRESSURE: 145 MMHG | HEIGHT: 75 IN | RESPIRATION RATE: 24 BRPM | TEMPERATURE: 98 F | WEIGHT: 279 LBS | OXYGEN SATURATION: 93 % | DIASTOLIC BLOOD PRESSURE: 77 MMHG | BODY MASS INDEX: 34.69 KG/M2

## 2024-12-01 DIAGNOSIS — R05.9 COUGH, UNSPECIFIED TYPE: Primary | ICD-10-CM

## 2024-12-01 DIAGNOSIS — R05.9 COUGH, UNSPECIFIED TYPE: ICD-10-CM

## 2024-12-01 LAB
CTP QC/QA: YES
POC MOLECULAR INFLUENZA A AGN: NEGATIVE
POC MOLECULAR INFLUENZA B AGN: NEGATIVE

## 2024-12-01 PROCEDURE — 96372 THER/PROPH/DIAG INJ SC/IM: CPT | Mod: S$GLB,,, | Performed by: PHYSICIAN ASSISTANT

## 2024-12-01 PROCEDURE — 71046 X-RAY EXAM CHEST 2 VIEWS: CPT | Mod: S$GLB,,, | Performed by: RADIOLOGY

## 2024-12-01 PROCEDURE — 87502 INFLUENZA DNA AMP PROBE: CPT | Mod: QW,S$GLB,, | Performed by: PHYSICIAN ASSISTANT

## 2024-12-01 PROCEDURE — 99214 OFFICE O/P EST MOD 30 MIN: CPT | Mod: 25,S$GLB,, | Performed by: PHYSICIAN ASSISTANT

## 2024-12-01 RX ORDER — ALBUTEROL SULFATE 90 UG/1
2 INHALANT RESPIRATORY (INHALATION) EVERY 6 HOURS PRN
Qty: 6.7 G | Refills: 0 | Status: SHIPPED | OUTPATIENT
Start: 2024-12-01

## 2024-12-01 RX ORDER — DEXAMETHASONE SODIUM PHOSPHATE 10 MG/ML
10 INJECTION INTRAMUSCULAR; INTRAVENOUS
Status: COMPLETED | OUTPATIENT
Start: 2024-12-01 | End: 2024-12-01

## 2024-12-01 RX ORDER — BROMPHENIRAMINE MALEATE, PSEUDOEPHEDRINE HYDROCHLORIDE, AND DEXTROMETHORPHAN HYDROBROMIDE 2; 30; 10 MG/5ML; MG/5ML; MG/5ML
10 SYRUP ORAL EVERY 6 HOURS PRN
Qty: 118 ML | Refills: 0 | Status: SHIPPED | OUTPATIENT
Start: 2024-12-01 | End: 2024-12-11

## 2024-12-01 RX ADMIN — DEXAMETHASONE SODIUM PHOSPHATE 10 MG: 10 INJECTION INTRAMUSCULAR; INTRAVENOUS at 11:12

## 2024-12-01 NOTE — PROGRESS NOTES
"Subjective:      Patient ID: Stevie Pal is a 79 y.o. male.    Vitals:  height is 6' 3" (1.905 m) and weight is 126.6 kg (279 lb). His oral temperature is 97.9 °F (36.6 °C). His blood pressure is 145/77 (abnormal) and his pulse is 81. His respiration is 24 (abnormal) and oxygen saturation is 93% (abnormal).     Chief Complaint: Cough    Patient present for cough. Symptoms started Thursday including nasal congestion, runny nose, SOB and sore throat. Patient tested positive for flu yesterday at urgent care. Negative COVID test. Prescribed Tamiflu.  He reports the cough is worse and is having increased SOB and chest wall pain when coughing. States tessalon perles are not working.  **Patient told MA that he put Ruy's vapor rub in each nostril prior to coming here which could account for negative flu test today**     Cough  This is a new problem. The current episode started in the past 7 days. The problem has been gradually worsening. The problem occurs constantly. The cough is Productive of sputum. Associated symptoms include nasal congestion, rhinorrhea, a sore throat and shortness of breath. Pertinent negatives include no chest pain, chills, ear congestion, ear pain, fever, headaches, heartburn, hemoptysis, myalgias, postnasal drip, rash, sweats, weight loss or wheezing. Nothing aggravates the symptoms. He has tried prescription cough suppressant for the symptoms. The treatment provided no relief. There is no history of asthma, bronchiectasis, bronchitis, COPD, emphysema, environmental allergies or pneumonia.       Constitution: Negative for chills and fever.   HENT:  Positive for sore throat. Negative for ear pain and postnasal drip.    Cardiovascular:  Negative for chest pain.   Respiratory:  Positive for cough and shortness of breath. Negative for bloody sputum and wheezing.    Gastrointestinal:  Negative for heartburn.   Musculoskeletal:  Negative for muscle ache.   Skin:  Negative for rash. "   Allergic/Immunologic: Negative for environmental allergies.   Neurological:  Negative for headaches.      Objective:     Physical Exam   Constitutional: He is oriented to person, place, and time. He appears well-developed.   HENT:   Head: Normocephalic and atraumatic.   Ears:   Right Ear: External ear normal.   Left Ear: External ear normal.   Nose: Nose normal.   Mouth/Throat: Oropharynx is clear and moist.   Eyes: Conjunctivae, EOM and lids are normal.   Neck: Trachea normal and phonation normal. Neck supple.   Cardiovascular: Normal rate.   Pulmonary/Chest: Effort normal. No respiratory distress. He has no wheezes. He has no rhonchi.   Musculoskeletal: Normal range of motion.         General: Normal range of motion.   Neurological: He is alert and oriented to person, place, and time.   Skin: Skin is warm, dry and intact.   Psychiatric: His speech is normal and behavior is normal. Judgment and thought content normal.   Nursing note and vitals reviewed.      Assessment:     1. Cough, unspecified type      Patient requested steroid injection. Discussed risks & possible side effects of steroid injection. Pt verbalized understanding of risks associated with injection and wished to proceed with treatment.     XR result:  No acute cardiopulmonary process.   Plan:   VSS. Patient non-toxic appearing. Discussed medication being prescribed.  Advised patient to follow up with PCP as needed.  Patient verbalized understanding, agrees with the plan, and is comfortable with discharge.      Cough, unspecified type  -     POCT Influenza A/B MOLECULAR  -     XR CHEST PA AND LATERAL; Future; Expected date: 12/01/2024  -     brompheniramine-pseudoeph-DM (BROMFED DM) 2-30-10 mg/5 mL Syrp; Take 10 mLs by mouth every 6 (six) hours as needed (cough).  Dispense: 118 mL; Refill: 0  -     albuterol (VENTOLIN HFA) 90 mcg/actuation inhaler; Inhale 2 puffs into the lungs every 6 (six) hours as needed for Shortness of Breath. Rescue   Dispense: 6.7 g; Refill: 0    Other orders  -     dexAMETHasone injection 10 mg      Medical Decision Making:   Clinical Tests:   Lab Tests: Ordered and Reviewed       <> Summary of Lab: Flu negative  Radiological Study: Ordered and Reviewed